# Patient Record
Sex: FEMALE | Race: WHITE | NOT HISPANIC OR LATINO
[De-identification: names, ages, dates, MRNs, and addresses within clinical notes are randomized per-mention and may not be internally consistent; named-entity substitution may affect disease eponyms.]

---

## 2020-01-02 ENCOUNTER — FORM ENCOUNTER (OUTPATIENT)
Age: 75
End: 2020-01-02

## 2020-10-19 ENCOUNTER — TRANSCRIPTION ENCOUNTER (OUTPATIENT)
Age: 75
End: 2020-10-19

## 2020-11-08 ENCOUNTER — FORM ENCOUNTER (OUTPATIENT)
Age: 75
End: 2020-11-08

## 2021-01-08 PROBLEM — Z00.00 ENCOUNTER FOR PREVENTIVE HEALTH EXAMINATION: Status: ACTIVE | Noted: 2021-01-08

## 2021-02-21 ENCOUNTER — TRANSCRIPTION ENCOUNTER (OUTPATIENT)
Age: 76
End: 2021-02-21

## 2021-11-18 DIAGNOSIS — Z87.39 PERSONAL HISTORY OF OTHER DISEASES OF THE MUSCULOSKELETAL SYSTEM AND CONNECTIVE TISSUE: ICD-10-CM

## 2021-11-19 ENCOUNTER — APPOINTMENT (OUTPATIENT)
Dept: BREAST CENTER | Facility: CLINIC | Age: 76
End: 2021-11-19
Payer: MEDICARE

## 2021-11-19 VITALS — HEIGHT: 67 IN | WEIGHT: 158 LBS | BODY MASS INDEX: 24.8 KG/M2

## 2021-11-19 DIAGNOSIS — Z80.3 FAMILY HISTORY OF MALIGNANT NEOPLASM OF BREAST: ICD-10-CM

## 2021-11-19 PROCEDURE — 99213 OFFICE O/P EST LOW 20 MIN: CPT

## 2021-11-19 RX ORDER — ROSUVASTATIN CALCIUM 5 MG/1
TABLET, FILM COATED ORAL
Refills: 0 | Status: ACTIVE | COMMUNITY

## 2021-11-19 RX ORDER — RALOXIFENE HYDROCHLORIDE 60 MG/1
60 TABLET ORAL
Refills: 0 | Status: ACTIVE | COMMUNITY

## 2021-11-22 NOTE — HISTORY OF PRESENT ILLNESS
[FreeTextEntry1] : Maternal aunt and mother with breast cancer at 50 and 47 years old\par Raloxifene\par \par Patient denies any breast masses or nipple discharge.  Patient is scheduled for a mammogram and ultrasound at the end of this month.  Patient has never had a breast biopsy and has never taken hormone replacement therapy.

## 2022-12-09 ENCOUNTER — RESULT REVIEW (OUTPATIENT)
Age: 77
End: 2022-12-09

## 2022-12-14 ENCOUNTER — APPOINTMENT (OUTPATIENT)
Dept: BREAST CENTER | Facility: CLINIC | Age: 77
End: 2022-12-14
Payer: MEDICARE

## 2022-12-14 VITALS — WEIGHT: 158 LBS | HEIGHT: 67 IN | BODY MASS INDEX: 24.8 KG/M2

## 2022-12-14 PROCEDURE — 99213 OFFICE O/P EST LOW 20 MIN: CPT

## 2022-12-16 NOTE — HISTORY OF PRESENT ILLNESS
[FreeTextEntry1] : Maternal aunt and mother with breast cancer at 50 and 47 years old\par Raloxifene\par \par Patient denies any breast masses or nipple discharge.  Recent mammogram was unremarkable.  Patient has never had a breast biopsy and has never taken hormone replacement therapy.

## 2023-12-12 ENCOUNTER — RESULT REVIEW (OUTPATIENT)
Age: 78
End: 2023-12-12

## 2023-12-13 ENCOUNTER — RESULT REVIEW (OUTPATIENT)
Age: 78
End: 2023-12-13

## 2023-12-15 ENCOUNTER — APPOINTMENT (OUTPATIENT)
Dept: BREAST CENTER | Facility: CLINIC | Age: 78
End: 2023-12-15
Payer: MEDICARE

## 2023-12-15 VITALS
BODY MASS INDEX: 24.96 KG/M2 | HEART RATE: 72 BPM | HEIGHT: 67 IN | OXYGEN SATURATION: 100 % | WEIGHT: 159 LBS | DIASTOLIC BLOOD PRESSURE: 80 MMHG | SYSTOLIC BLOOD PRESSURE: 133 MMHG

## 2023-12-15 DIAGNOSIS — R92.8 OTHER ABNORMAL AND INCONCLUSIVE FINDINGS ON DIAGNOSTIC IMAGING OF BREAST: ICD-10-CM

## 2023-12-15 DIAGNOSIS — Z80.3 FAMILY HISTORY OF MALIGNANT NEOPLASM OF BREAST: ICD-10-CM

## 2023-12-15 PROCEDURE — 99213 OFFICE O/P EST LOW 20 MIN: CPT

## 2023-12-18 PROBLEM — Z80.3 FAMILY HISTORY OF BREAST CANCER: Status: ACTIVE | Noted: 2021-11-18

## 2023-12-18 PROBLEM — R92.8 ABNORMAL FINDING ON BREAST IMAGING: Status: ACTIVE | Noted: 2023-12-12

## 2024-12-16 ENCOUNTER — RESULT REVIEW (OUTPATIENT)
Age: 79
End: 2024-12-16

## 2024-12-20 ENCOUNTER — APPOINTMENT (OUTPATIENT)
Dept: BREAST CENTER | Facility: CLINIC | Age: 79
End: 2024-12-20
Payer: MEDICARE

## 2024-12-20 VITALS — HEIGHT: 67 IN | WEIGHT: 159 LBS | BODY MASS INDEX: 24.96 KG/M2

## 2024-12-20 DIAGNOSIS — Z80.3 FAMILY HISTORY OF MALIGNANT NEOPLASM OF BREAST: ICD-10-CM

## 2024-12-20 DIAGNOSIS — Z12.31 ENCOUNTER FOR SCREENING MAMMOGRAM FOR MALIGNANT NEOPLASM OF BREAST: ICD-10-CM

## 2024-12-20 PROCEDURE — 99212 OFFICE O/P EST SF 10 MIN: CPT
